# Patient Record
(demographics unavailable — no encounter records)

---

## 2017-09-08 NOTE — RAD
SINGLE VIEW OF THE CHEST AND RIGHT RIB SERIES:

 

COMPARISON: 

None.

 

HISTORY: 

Right rib pain.

 

FINDINGS: 

A single view of the chest and multiple views of the right ribs shows no evidence of a displaced rig
ht rib fracture.  No expansile rib lesions are seen.

 

The cardiomediastinal silhouette is normal in size.  There is no evidence of consolidation, mass, or
 pleural effusion.

 

IMPRESSION: 

No significant right rib abnormality.

 

POS: [facility]